# Patient Record
Sex: FEMALE
[De-identification: names, ages, dates, MRNs, and addresses within clinical notes are randomized per-mention and may not be internally consistent; named-entity substitution may affect disease eponyms.]

---

## 2017-01-17 ENCOUNTER — RECORD ABSTRACTING (OUTPATIENT)
Age: 22
End: 2017-01-17

## 2017-01-17 ENCOUNTER — APPOINTMENT (OUTPATIENT)
Dept: ALLERGY | Facility: CLINIC | Age: 22
End: 2017-01-17

## 2017-01-17 VITALS
WEIGHT: 215 LBS | RESPIRATION RATE: 14 BRPM | HEART RATE: 80 BPM | SYSTOLIC BLOOD PRESSURE: 140 MMHG | DIASTOLIC BLOOD PRESSURE: 90 MMHG | BODY MASS INDEX: 36.7 KG/M2 | HEIGHT: 64 IN

## 2017-01-17 DIAGNOSIS — Z83.3 FAMILY HISTORY OF DIABETES MELLITUS: ICD-10-CM

## 2017-01-17 DIAGNOSIS — Z87.09 PERSONAL HISTORY OF OTHER DISEASES OF THE RESPIRATORY SYSTEM: ICD-10-CM

## 2017-01-17 DIAGNOSIS — J45.20 MILD INTERMITTENT ASTHMA, UNCOMPLICATED: ICD-10-CM

## 2017-01-17 DIAGNOSIS — F17.210 NICOTINE DEPENDENCE, CIGARETTES, UNCOMPLICATED: ICD-10-CM

## 2017-01-17 RX ORDER — DEXTROAMPHETAMINE SULFATE, DEXTROAMPHETAMINE SACCHARATE, AMPHETAMINE SULFATE AND AMPHETAMINE ASPARTATE 7.5; 7.5; 7.5; 7.5 MG/1; MG/1; MG/1; MG/1
30 CAPSULE, EXTENDED RELEASE ORAL
Refills: 0 | Status: ACTIVE | COMMUNITY

## 2017-01-17 RX ORDER — ALBUTEROL SULFATE 90 UG/1
108 AEROSOL, METERED RESPIRATORY (INHALATION)
Refills: 0 | Status: ACTIVE | COMMUNITY

## 2017-01-17 RX ORDER — DEXTROAMPHETAMINE SULFATE, DEXTROAMPHETAMINE SACCHARATE, AMPHETAMINE SULFATE AND AMPHETAMINE ASPARTATE 3.75; 3.75; 3.75; 3.75 MG/1; MG/1; MG/1; MG/1
15 CAPSULE, EXTENDED RELEASE ORAL
Refills: 0 | Status: ACTIVE | COMMUNITY

## 2017-01-23 ENCOUNTER — RESULT REVIEW (OUTPATIENT)
Age: 22
End: 2017-01-23

## 2017-02-08 ENCOUNTER — TRANSCRIPTION ENCOUNTER (OUTPATIENT)
Age: 22
End: 2017-02-08

## 2017-08-10 ENCOUNTER — APPOINTMENT (OUTPATIENT)
Dept: ORTHOPEDIC SURGERY | Facility: CLINIC | Age: 22
End: 2017-08-10
Payer: COMMERCIAL

## 2017-08-10 VITALS — BODY MASS INDEX: 32.44 KG/M2 | HEIGHT: 64 IN | WEIGHT: 190 LBS

## 2017-08-10 VITALS — DIASTOLIC BLOOD PRESSURE: 76 MMHG | SYSTOLIC BLOOD PRESSURE: 108 MMHG | HEART RATE: 69 BPM

## 2017-08-10 DIAGNOSIS — S99.911A UNSPECIFIED INJURY OF RIGHT ANKLE, INITIAL ENCOUNTER: ICD-10-CM

## 2017-08-10 DIAGNOSIS — M21.6X1 OTHER ACQUIRED DEFORMITIES OF RIGHT FOOT: ICD-10-CM

## 2017-08-10 DIAGNOSIS — M25.571 PAIN IN RIGHT ANKLE AND JOINTS OF RIGHT FOOT: ICD-10-CM

## 2017-08-10 PROCEDURE — 73610 X-RAY EXAM OF ANKLE: CPT | Mod: RT

## 2017-08-10 PROCEDURE — 99203 OFFICE O/P NEW LOW 30 MIN: CPT

## 2017-09-04 PROBLEM — S99.911A RIGHT ANKLE INJURY: Status: ACTIVE | Noted: 2017-09-04

## 2017-09-04 PROBLEM — M21.6X1 GASTROCNEMIUS EQUINUS OF RIGHT LOWER EXTREMITY: Status: ACTIVE | Noted: 2017-09-04

## 2017-09-04 PROBLEM — S99.911A INJURY OF RIGHT ANKLE, INITIAL ENCOUNTER: Status: ACTIVE | Noted: 2017-09-04

## 2018-11-03 ENCOUNTER — TRANSCRIPTION ENCOUNTER (OUTPATIENT)
Age: 23
End: 2018-11-03

## 2018-12-04 ENCOUNTER — RESULT REVIEW (OUTPATIENT)
Age: 23
End: 2018-12-04

## 2019-01-24 ENCOUNTER — APPOINTMENT (OUTPATIENT)
Dept: ALLERGY | Facility: CLINIC | Age: 24
End: 2019-01-24
Payer: COMMERCIAL

## 2019-01-24 VITALS
DIASTOLIC BLOOD PRESSURE: 80 MMHG | SYSTOLIC BLOOD PRESSURE: 110 MMHG | RESPIRATION RATE: 16 BRPM | WEIGHT: 210 LBS | BODY MASS INDEX: 35.85 KG/M2 | HEIGHT: 64 IN | HEART RATE: 72 BPM

## 2019-01-24 PROCEDURE — 99214 OFFICE O/P EST MOD 30 MIN: CPT | Mod: 25

## 2019-01-24 PROCEDURE — 94060 EVALUATION OF WHEEZING: CPT

## 2019-01-24 RX ORDER — AMOXICILLIN 500 MG/1
500 CAPSULE ORAL
Qty: 21 | Refills: 0 | Status: DISCONTINUED | COMMUNITY
Start: 2018-08-16

## 2019-01-24 RX ORDER — VALACYCLOVIR 500 MG/1
500 TABLET, FILM COATED ORAL
Qty: 30 | Refills: 0 | Status: ACTIVE | COMMUNITY
Start: 2018-12-04

## 2019-01-24 RX ORDER — LORAZEPAM 1 MG/1
1 TABLET ORAL
Qty: 30 | Refills: 0 | Status: ACTIVE | COMMUNITY
Start: 2018-12-03

## 2019-01-24 RX ORDER — ALBUTEROL SULFATE 90 UG/1
108 (90 BASE) AEROSOL, METERED RESPIRATORY (INHALATION)
Qty: 2 | Refills: 1 | Status: ACTIVE | COMMUNITY
Start: 2019-01-24 | End: 1900-01-01

## 2019-01-24 RX ORDER — MUPIROCIN 20 MG/G
2 OINTMENT TOPICAL
Qty: 22 | Refills: 0 | Status: DISCONTINUED | COMMUNITY
Start: 2018-12-04

## 2019-01-24 RX ORDER — IBUPROFEN 800 MG/1
800 TABLET ORAL
Qty: 18 | Refills: 0 | Status: DISCONTINUED | COMMUNITY
Start: 2018-08-16

## 2019-01-24 RX ORDER — CLINDAMYCIN PHOSPHATE 100 MG/5G
2 CREAM VAGINAL
Qty: 5 | Refills: 0 | Status: DISCONTINUED | COMMUNITY
Start: 2018-12-19

## 2019-01-24 RX ORDER — METRONIDAZOLE 7.5 MG/G
0.75 GEL VAGINAL
Qty: 70 | Refills: 0 | Status: DISCONTINUED | COMMUNITY
Start: 2019-01-23

## 2019-01-24 NOTE — SOCIAL HISTORY
[Mother] : mother [Father] : father [House] : [unfilled] lives in a house  [Radiator/Baseboard] : heating provided by radiator(s)/baseboard(s) [Central] : air conditioning provided by central unit [Dog] : dog [Single] : single [FreeTextEntry2] : health insurance [employer] care registration [Bedroom] : not in the bedroom [Basement] : not in the basement [Living Area] : not in the living area [Smokers in Household] : there are no smokers in the home [de-identified] : x2

## 2019-01-24 NOTE — HISTORY OF PRESENT ILLNESS
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    Patient uses rescue inhaler with hiking at high elevation and prior to scuba diving preventatively.   Otherwise she has not needed her inhaler.    She does not need the inhaler for scuba diving but using as a good idea.   She will be away 10-12 weeks.

## 2019-01-24 NOTE — ASSESSMENT
[FreeTextEntry1] : Mild intermittent asthma:\par \par Proventil 2 puffs prior to scuba diving as a precaution \par Patient can scuba dive based upon normal spirometry and no asthma symptoms other than related to exertion at high altitude.

## 2019-01-24 NOTE — PHYSICAL EXAM
[Alert] : alert [Well Nourished] : well nourished [Healthy Appearance] : healthy appearance [No Acute Distress] : no acute distress [Well Developed] : well developed [Normal Voice/Communication] : normal voice communication [Normal Lips/Tongue] : the lips and tongue were normal [Normal Tonsils] : normal tonsils [No Oral Lesions or Ulcers] : no oral lesions or ulcers [No Neck Mass] : no neck mass was observed [No LAD] : no lymphadenopathy [No Thyroid Mass] : no thyroid mass [Supple] : the neck was supple [Normal Rate and Effort] : normal respiratory rhythm and effort [No Crackles] : no crackles [No Retractions] : no retractions [Bilateral Audible Breath Sounds] : bilateral audible breath sounds [Normal Rate] : heart rate was normal  [Normal S1, S2] : normal S1 and S2 [No murmur] : no murmur [Regular Rhythm] : with a regular rhythm [Normal Cervical Lymph Nodes] : cervical [Skin Intact] : skin intact  [No Rash] : no rash [No clubbing] : no clubbing [No Edema] : no edema [No Cyanosis] : no cyanosis [Normal Mood] : mood was normal [Normal Affect] : affect was normal [Judgment and Insight Age Appropriate] : judgement and insight is age appropriate [Alert, Awake, Oriented as Age-Appropriate] : alert, awake, oriented as age appropriate [Conjunctival Erythema] : no conjunctival erythema [Suborbital Bogginess] : no suborbital bogginess (allergic shiners) [Wheezing] : no wheezing was heard

## 2019-06-13 ENCOUNTER — APPOINTMENT (OUTPATIENT)
Dept: ALLERGY | Facility: CLINIC | Age: 24
End: 2019-06-13
Payer: COMMERCIAL

## 2019-06-13 VITALS
WEIGHT: 198 LBS | DIASTOLIC BLOOD PRESSURE: 77 MMHG | SYSTOLIC BLOOD PRESSURE: 108 MMHG | RESPIRATION RATE: 16 BRPM | HEART RATE: 65 BPM | BODY MASS INDEX: 33.8 KG/M2 | HEIGHT: 64 IN | OXYGEN SATURATION: 98 %

## 2019-06-13 DIAGNOSIS — Z87.09 PERSONAL HISTORY OF OTHER DISEASES OF THE RESPIRATORY SYSTEM: ICD-10-CM

## 2019-06-13 PROCEDURE — 99214 OFFICE O/P EST MOD 30 MIN: CPT | Mod: 25

## 2019-06-13 PROCEDURE — 94060 EVALUATION OF WHEEZING: CPT

## 2019-06-13 RX ORDER — ALBUTEROL SULFATE 90 UG/1
108 (90 BASE) AEROSOL, METERED RESPIRATORY (INHALATION)
Qty: 2 | Refills: 1 | Status: ACTIVE | OUTPATIENT
Start: 2017-01-17

## 2019-06-13 RX ORDER — METRONIDAZOLE 500 MG/1
500 TABLET ORAL
Qty: 60 | Refills: 0 | Status: DISCONTINUED | COMMUNITY
Start: 2019-04-08

## 2019-06-13 RX ORDER — METRONIDAZOLE 65 MG/5G
1.3 GEL TOPICAL
Qty: 5 | Refills: 0 | Status: DISCONTINUED | COMMUNITY
Start: 2019-01-31

## 2019-06-13 NOTE — PHYSICAL EXAM
[Alert] : alert [Well Nourished] : well nourished [Healthy Appearance] : healthy appearance [No Acute Distress] : no acute distress [Well Developed] : well developed [Normal Voice/Communication] : normal voice communication [Conjunctival Erythema] : no conjunctival erythema [Suborbital Bogginess] : no suborbital bogginess (allergic shiners) [Normal TMs] : both tympanic membranes were normal [Normal Lips/Tongue] : the lips and tongue were normal [Normal Outer Ear/Nose] : the ears and nose were normal in appearance [Normal Tonsils] : normal tonsils [No Thrush] : no thrush [Boggy Nasal Turbinates] : boggy and/or pale nasal turbinates [No Oral Lesions or Ulcers] : no oral lesions or ulcers [No Neck Mass] : no neck mass was observed [Clear Rhinorrhea] : clear rhinorrhea was seen [No LAD] : no lymphadenopathy [No Thyroid Mass] : no thyroid mass [Supple] : the neck was supple [Normal Rate and Effort] : normal respiratory rhythm and effort [No Crackles] : no crackles [No Retractions] : no retractions [Bilateral Audible Breath Sounds] : bilateral audible breath sounds [Wheezing] : no wheezing was heard [Normal Rate] : heart rate was normal  [Normal S1, S2] : normal S1 and S2 [No murmur] : no murmur [Regular Rhythm] : with a regular rhythm [Skin Intact] : skin intact  [Normal Cervical Lymph Nodes] : cervical [No Rash] : no rash [No clubbing] : no clubbing [No Edema] : no edema [No Cyanosis] : no cyanosis [Normal Mood] : mood was normal [Normal Affect] : affect was normal [Judgment and Insight Age Appropriate] : judgement and insight is age appropriate [Alert, Awake, Oriented as Age-Appropriate] : alert, awake, oriented as age appropriate

## 2019-06-13 NOTE — HISTORY OF PRESENT ILLNESS
[de-identified] : Patient did well with scuba diving and she developed a double ear infection - symptoms improved and hearing has almost returned to normal.  Patient desires to work with scuba diving.  Patient going to [place] next week - accumulating a portfolio.   Patient is using her rescue inhaler only.   Patient now with nasal congestion , sneezing, rhinorrhea, yellow nasal mucus.

## 2019-06-13 NOTE — ASSESSMENT
[FreeTextEntry1] : Mild intermittent asthma:\par \par Proair 2 puffs QID prn \par \par Sinusitis:\par \par Cefzil 250 mg BID x 14 days

## 2019-06-13 NOTE — SOCIAL HISTORY
[Mother] : mother [Father] : father [FreeTextEntry2] : health insurance [employer] care registration [House] : [unfilled] lives in a house  [Radiator/Baseboard] : heating provided by radiator(s)/baseboard(s) [Central] : air conditioning provided by central unit [Bedroom] : not in the bedroom [Basement] : not in the basement [Living Area] : not in the living area [Dog] : dog [Single] : single [Smokers in Household] : there are no smokers in the home [de-identified] : x2

## 2019-09-04 ENCOUNTER — TRANSCRIPTION ENCOUNTER (OUTPATIENT)
Age: 24
End: 2019-09-04

## 2019-12-02 ENCOUNTER — TRANSCRIPTION ENCOUNTER (OUTPATIENT)
Age: 24
End: 2019-12-02

## 2020-12-21 PROBLEM — Z87.09 HISTORY OF ACUTE SINUSITIS: Status: RESOLVED | Noted: 2019-06-13 | Resolved: 2020-12-21

## 2021-03-25 ENCOUNTER — APPOINTMENT (OUTPATIENT)
Dept: ALLERGY | Facility: CLINIC | Age: 26
End: 2021-03-25
Payer: COMMERCIAL

## 2021-03-25 PROCEDURE — 99213 OFFICE O/P EST LOW 20 MIN: CPT | Mod: 95

## 2021-03-25 RX ORDER — ALBUTEROL SULFATE 90 UG/1
108 (90 BASE) INHALANT RESPIRATORY (INHALATION)
Qty: 1 | Refills: 2 | Status: ACTIVE | COMMUNITY
Start: 2021-03-25 | End: 1900-01-01

## 2021-03-25 NOTE — HISTORY OF PRESENT ILLNESS
[Home] : at home, [unfilled] , at the time of the visit. 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  She will be scuba diving regularly in this program.  She has had minimal use of her rescue inhaler - she will need respiratory clearance in order to start the program.

## 2021-03-25 NOTE — ASSESSMENT
[FreeTextEntry1] : Mild intermittent asthma:\par \par I have advised Julieta that she needs an updated spirometry/PFT in order to obtain clearance for her next program and that she should consult with a pulmonary physician in [place] for evaluation,  In the interim I have renewed her Proair. \par \par This visit was provided via telehealth using real time 2-way audio visual technology.  The patient, Julieta Ross, was located at home, at the time of the visit.   The provider, Mitchell Boxer, M.D., was located at the office, [address] [place] [address], at the time of the visit.\par \par The patient, Julieta Ross and physician, Mitchell Boxer, M.D., participated in the telehealth encounter.\par \par Verbal consent obtained by  from patient.\par \par The majority of time (>50%) was spent on counseling and coordination of care with this patient and/or family member.  The approximate physician face to face time was 20 minutes for the diagnosis of mild intermittent asthma. \par

## 2021-07-23 ENCOUNTER — APPOINTMENT (OUTPATIENT)
Dept: OBGYN | Facility: CLINIC | Age: 26
End: 2021-07-23
Payer: COMMERCIAL

## 2021-07-23 VITALS
WEIGHT: 220 LBS | BODY MASS INDEX: 37.56 KG/M2 | SYSTOLIC BLOOD PRESSURE: 124 MMHG | HEIGHT: 64 IN | DIASTOLIC BLOOD PRESSURE: 80 MMHG

## 2021-07-23 DIAGNOSIS — Z00.00 ENCOUNTER FOR GENERAL ADULT MEDICAL EXAMINATION W/OUT ABNORMAL FINDINGS: ICD-10-CM

## 2021-07-23 PROCEDURE — 87070 CULTURE OTHR SPECIMN AEROBIC: CPT

## 2021-07-23 PROCEDURE — 99395 PREV VISIT EST AGE 18-39: CPT

## 2021-07-23 PROCEDURE — 99072 ADDL SUPL MATRL&STAF TM PHE: CPT

## 2021-07-23 PROCEDURE — 36415 COLL VENOUS BLD VENIPUNCTURE: CPT

## 2021-07-23 RX ORDER — CEFPROZIL 250 MG/1
250 TABLET ORAL
Qty: 28 | Refills: 0 | Status: DISCONTINUED | COMMUNITY
Start: 2019-06-13 | End: 2021-07-23

## 2021-07-27 LAB
C TRACH RRNA SPEC QL NAA+PROBE: NOT DETECTED
CANDIDA VAG CYTO: NOT DETECTED
G VAGINALIS+PREV SP MTYP VAG QL MICRO: DETECTED
HBV SURFACE AG SER QL: NONREACTIVE
HCV AB SER QL: NONREACTIVE
HCV S/CO RATIO: 0.12 S/CO
HIV1+2 AB SPEC QL IA.RAPID: NONREACTIVE
N GONORRHOEA RRNA SPEC QL NAA+PROBE: NOT DETECTED
SOURCE AMPLIFICATION: NORMAL
T PALLIDUM AB SER QL IA: NEGATIVE
T VAGINALIS VAG QL WET PREP: NOT DETECTED

## 2021-07-29 DIAGNOSIS — B96.89 ACUTE VAGINITIS: ICD-10-CM

## 2021-07-29 DIAGNOSIS — N76.0 ACUTE VAGINITIS: ICD-10-CM

## 2021-08-04 LAB — CYTOLOGY CVX/VAG DOC THIN PREP: ABNORMAL

## 2021-08-06 ENCOUNTER — TRANSCRIPTION ENCOUNTER (OUTPATIENT)
Age: 26
End: 2021-08-06

## 2022-04-11 ENCOUNTER — NON-APPOINTMENT (OUTPATIENT)
Age: 27
End: 2022-04-11

## 2022-06-29 DIAGNOSIS — N76.0 ACUTE VAGINITIS: ICD-10-CM

## 2022-06-29 RX ORDER — FLUCONAZOLE 150 MG/1
150 TABLET ORAL
Qty: 2 | Refills: 0 | Status: ACTIVE | COMMUNITY
Start: 2019-01-23

## 2022-06-29 RX ORDER — METRONIDAZOLE 7.5 MG/G
0.75 GEL VAGINAL
Qty: 1 | Refills: 0 | Status: ACTIVE | COMMUNITY
Start: 2021-07-29 | End: 1900-01-01

## 2023-08-19 ENCOUNTER — APPOINTMENT (OUTPATIENT)
Dept: ORTHOPEDIC SURGERY | Facility: CLINIC | Age: 28
End: 2023-08-19
Payer: COMMERCIAL

## 2023-08-19 ENCOUNTER — NON-APPOINTMENT (OUTPATIENT)
Age: 28
End: 2023-08-19

## 2023-08-19 VITALS — SYSTOLIC BLOOD PRESSURE: 138 MMHG | DIASTOLIC BLOOD PRESSURE: 85 MMHG | HEART RATE: 85 BPM | HEIGHT: 64 IN

## 2023-08-19 VITALS — WEIGHT: 225 LBS | BODY MASS INDEX: 38.62 KG/M2

## 2023-08-19 DIAGNOSIS — M79.672 PAIN IN LEFT FOOT: ICD-10-CM

## 2023-08-19 PROCEDURE — 99204 OFFICE O/P NEW MOD 45 MIN: CPT

## 2023-08-19 PROCEDURE — 73630 X-RAY EXAM OF FOOT: CPT | Mod: LT

## 2023-08-19 RX ORDER — DICLOFENAC SODIUM 75 MG/1
75 TABLET, DELAYED RELEASE ORAL
Qty: 60 | Refills: 0 | Status: ACTIVE | COMMUNITY
Start: 2023-08-19 | End: 1900-01-01

## 2023-08-23 PROBLEM — M79.672 LEFT FOOT PAIN: Status: ACTIVE | Noted: 2023-08-19

## 2023-08-25 ENCOUNTER — APPOINTMENT (OUTPATIENT)
Dept: MRI IMAGING | Facility: CLINIC | Age: 28
End: 2023-08-25

## 2023-08-25 ENCOUNTER — OUTPATIENT (OUTPATIENT)
Dept: OUTPATIENT SERVICES | Facility: HOSPITAL | Age: 28
LOS: 1 days | End: 2023-08-25
Payer: COMMERCIAL

## 2023-08-25 ENCOUNTER — RESULT REVIEW (OUTPATIENT)
Age: 28
End: 2023-08-25

## 2023-08-25 DIAGNOSIS — M84.375A STRESS FRACTURE, LEFT FOOT, INITIAL ENCOUNTER FOR FRACTURE: ICD-10-CM

## 2023-08-25 DIAGNOSIS — M79.672 PAIN IN LEFT FOOT: ICD-10-CM

## 2023-08-25 PROCEDURE — 73718 MRI LOWER EXTREMITY W/O DYE: CPT | Mod: 26,LT

## 2023-08-26 ENCOUNTER — NON-APPOINTMENT (OUTPATIENT)
Age: 28
End: 2023-08-26

## 2023-08-26 NOTE — HISTORY OF PRESENT ILLNESS
[FreeTextEntry1] : 08/19/2023: Patient is a 28 year-old female who presents to the office today for initial evaluation of left foot pain. Injury occurred a month ago when she everted her left foot when walking on an uneven surface when in [place]. Started a new job, foot felt better. Last weekend went for a foot massage which made pain return. Pain is constant and waxes and wanes in intensity. She also states she hears a popping/clicking noise while walking this past week. Pain is directly over the distal aspect of the 4th metatarsal. Advil gives some mild relief, as well as ice. She denies paresthesias of the foot.  No bleeding, fever, chills, sweats, nausea or vomiting were endorsed at this visit. The above history is in addition to the intake form, which I personally reviewed at length, including the patient's medical, surgical, and family history. The patient's allergies were also carefully reviewed. In addition, the family and social history of the patient were also reviewed, which are non-contributory to this visit unless specified above. All of this has been documented accordingly in the visit note.

## 2023-08-26 NOTE — PHYSICAL EXAM
[Normal Finger/nose] : finger to nose coordination [Normal] : no peripheral adenopathy appreciated [de-identified] : Left Foot/Ankle Exam  Skin warm, Dry, no lesions, no erythema, no bleeding, no open wounds No Scars  Pulse to DP and PT intact  Sensation to touch intact  Syndesmotic Squeeze test - Negative  No deformities noted to foot or ankle   Swelling - N/A Tenderness - across distal metatarsals, particularly 2-4 No tenderness to  Lateral Mall, Medial Mall, Achilles, Calcaneus, 5th metatarsal, mid foot, toes.  No Anterior Drawer noted  Conner Test - reveals intact Achilles   Lindsay's Test Positive   ROM Plantarflexion - 0-50 Dorsiflexion - 0-20 Inversion - 0-35 Eversion 0-15  No tenderness to proximal fibula  [de-identified] : CHRISTOPHERR [de-identified] : 3 xray views of the left foot were obtained.  -No fractures or dislocations

## 2023-08-26 NOTE — ASSESSMENT
[FreeTextEntry1] : Patient presents with left foot pain. Their symptoms are consistent with a possible neuroma. The nature of this condition was described at length with the patient and they verbalized understanding.   Recommendations:  -MRI Left Foot to rule out a neuroma -Diclofenac (Rx Sent) -WBAT/Activity as tolerated -Followup with Dr. Benavides after MRI to discuss results and further treatment options -Patient was given ample time to ask questions and all questions were answered to the patient's full satisfaction.  The patient was in full agreement with this plan and appreciative of their care.

## 2024-02-06 ENCOUNTER — APPOINTMENT (OUTPATIENT)
Dept: ORTHOPEDIC SURGERY | Facility: CLINIC | Age: 29
End: 2024-02-06
Payer: COMMERCIAL

## 2024-02-06 VITALS — HEIGHT: 64 IN | WEIGHT: 250 LBS | BODY MASS INDEX: 42.68 KG/M2

## 2024-02-06 DIAGNOSIS — M67.472 GANGLION, LEFT ANKLE AND FOOT: ICD-10-CM

## 2024-02-06 PROCEDURE — 99214 OFFICE O/P EST MOD 30 MIN: CPT | Mod: 25

## 2024-02-06 PROCEDURE — 20612 ASPIRATE/INJ GANGLION CYST: CPT

## 2024-02-06 NOTE — HISTORY OF PRESENT ILLNESS
[Other: ____] : [unfilled] [FreeTextEntry1] : Reports ganglion cyst dorsolateral aspect L hindfoot for several weeks.  Denies antecedent trauma nor additional musculoskeletal complaints referable to foot / ankle.

## 2024-02-06 NOTE — PHYSICAL EXAM
[de-identified] : Extremity: +equinus (releases) L LE, +PPAV L foot (supple), presumptive ganglion cyst dorsolateral aspect L hindfoot.  Nontender L ankle, peroneals, syndesmosis, Achilles, hindfoot ST, midfoot LF and PTT insertional, and forefoot / metatarsals.  Calves soft and nontender, sensorimotor unchanged, skin intact L LE.  AOx3, mood / affect normal. [de-identified] : EXAM: [id number] - MR FOOT LT  - ORDERED BY: MUNDO GALLAGHER PROCEDURE DATE:  08/25/2023 INTERPRETATION:  CLINICAL HISTORY: 28-year-old with pain. For assessment of Lindsay's neuroma. FINDINGS: MRI of the left forefoot was performed in the axial, coronal and sagittal planes with proton density and fluid sensitive weighting with and without fat suppression. Reference is made to radiograph dated 11/3/2018.  Evaluation of the forefoot demonstrate that the tarsometatarsal joint is in appropriate alignment. The Lisfranc ligament is intact. There is a significant zone of marrow edema throughout the base of the fourth metatarsal with linear component consistent with fracture (image 26, sagittal). There is intermetatarsal bursitis of the first, second and third interspace. There is no evidence of Lindsay's neuroma. There is no abnormal signal within either the medial and lateral sesamoid. Plantar plate mechanism of the first MTP joint is intact. There is edematous infiltration of the subcutaneous tissues dorsally and laterally. No significant muscle edema.  IMPRESSION:  Stress fracture of the base of the fourth metatarsal.  Dr. Jessica Brown can be reached at [email or web address] with questions regarding this report.  --- End of Report ---  JESSICA BROWN MD; Attending Radiologist This document has been electronically signed. Aug 25 2023 11:45AM

## 2024-02-06 NOTE — DISCUSSION/SUMMARY
[de-identified] : Discussed with patient nature of condition, potential course / sequelae, options reviewed and patient requesting aspiration procedure (risks, benefits reviewed in advance).  Under sterile conditions, aspiration 1.5 cc clear gelatinous fluid from ganglion cyst dorsolateral aspect L hindfoot, tolerated well by patient and Band-Aid applied.  NB shoe wear, activity modification, HEP.  Return to office in 6 - 8 weeks / PRN, all questions answered.

## 2024-05-16 ENCOUNTER — APPOINTMENT (OUTPATIENT)
Dept: OBGYN | Facility: CLINIC | Age: 29
End: 2024-05-16
Payer: SELF-PAY

## 2024-05-16 VITALS
WEIGHT: 242 LBS | BODY MASS INDEX: 41.32 KG/M2 | HEIGHT: 64 IN | SYSTOLIC BLOOD PRESSURE: 137 MMHG | DIASTOLIC BLOOD PRESSURE: 89 MMHG

## 2024-05-16 DIAGNOSIS — Z01.419 ENCOUNTER FOR GYNECOLOGICAL EXAMINATION (GENERAL) (ROUTINE) W/OUT ABNORMAL FINDINGS: ICD-10-CM

## 2024-05-16 DIAGNOSIS — N76.0 ACUTE VAGINITIS: ICD-10-CM

## 2024-05-16 DIAGNOSIS — N92.0 EXCESSIVE AND FREQUENT MENSTRUATION WITH REGULAR CYCLE: ICD-10-CM

## 2024-05-16 DIAGNOSIS — Z11.3 ENCOUNTER FOR SCREENING FOR INFECTIONS WITH A PREDOMINANTLY SEXUAL MODE OF TRANSMISSION: ICD-10-CM

## 2024-05-16 PROCEDURE — 99395 PREV VISIT EST AGE 18-39: CPT

## 2024-05-16 PROCEDURE — 99459 PELVIC EXAMINATION: CPT

## 2024-05-17 LAB
BV BACTERIA RRNA VAG QL NAA+PROBE: NOT DETECTED
C GLABRATA RNA VAG QL NAA+PROBE: NOT DETECTED
CANDIDA RRNA VAG QL PROBE: NOT DETECTED
HPV HIGH+LOW RISK DNA PNL CVX: NOT DETECTED
T VAGINALIS RRNA SPEC QL NAA+PROBE: NOT DETECTED

## 2024-05-22 LAB — CYTOLOGY CVX/VAG DOC THIN PREP: NORMAL

## 2024-05-22 NOTE — PLAN
[FreeTextEntry1] : Routine GYN: D/w pt the importance of BSE. Pelvic and breast exam performed. Pap performed. GC/Chl via Pap.  Rx given for STI blood panel.   Recurrent BV: Vaginitis panel performed Advised weekly boric acid suppository  R/o PCOS: Advised to schedule pelvic sono and brief MD visit, rx given Endocrine will draw panel for PCOS  RTO in 1 year, or PRN.

## 2024-05-22 NOTE — SIGNATURES
[TextEntry] : This note was authored by Lamont Doherty working as a scribe for Dr. Rafael Monet.   I, Dr. Rafael Monet, have reviewed the content of this note and confirm it is true and accurate. I personally performed the history and physical examination and made all the decisions. 05/16/2024

## 2024-05-22 NOTE — HISTORY OF PRESENT ILLNESS
[FreeTextEntry1] : 05/16/2024. RUBEN DAIGLE 28-year-old female G0 LMP 5/5. She presents for an annual gyn exam.  Patient was seen by GYN in [place] for recurrent BV in 11/22, IUD was removed at the time. However, she reports persistent vaginal malodor.   She reports monthly menses, lasting 5 days in duration, heavier in flow.   Pt recently dx'd w/ hypothyroidism, for which she is followed by endocrinologist and nutritionist. Admits significant weight gain and decreased libido since last visit. She reports dark boils on inner thighs. Admits 10-lbs since starting Ozempic in 12/23.  She denies abdominal and pelvic pain. No abnormal vaginal bleeding, vaginal discharge, or vaginitis symptoms. She reports normal urination, no dysuria, no incontinence of urine. BM is normal per patient, no blood in stool or constipation/diarrhea.  Pt just got out of long-term relationship.  Otherwise, no new medical conditions, medications, or surgeries since last visit.  PMHx: hypothyroid, asthma, insulin-resistance, fatty liver SocHx: works as   Meds: Ozempic, Levothyroxine 75, weekly Vit D, Ashwagandha, Liothyronine, Beata B

## 2024-05-22 NOTE — PHYSICAL EXAM
[Chaperone Present] : A chaperone was present in the examining room during all aspects of the physical examination [[id number]] : A chaperone was present during the pelvic exam. [Appropriately responsive] : appropriately responsive [Alert] : alert [No Acute Distress] : no acute distress [No Lymphadenopathy] : no lymphadenopathy [Regular Rate Rhythm] : regular rate rhythm [No Murmurs] : no murmurs [Clear to Auscultation B/L] : clear to auscultation bilaterally [Soft] : soft [Non-tender] : non-tender [Non-distended] : non-distended [No HSM] : No HSM [No Lesions] : no lesions [No Mass] : no mass [Oriented x3] : oriented x3 [Examination Of The Breasts] : a normal appearance [No Masses] : no breast masses were palpable [Labia Majora] : normal [Labia Minora] : normal [Normal] : normal [Uterine Adnexae] : normal [FreeTextEntry2] : Lamont Doherty

## 2024-06-26 ENCOUNTER — APPOINTMENT (OUTPATIENT)
Dept: OBGYN | Facility: CLINIC | Age: 29
End: 2024-06-26

## 2024-07-29 ENCOUNTER — NON-APPOINTMENT (OUTPATIENT)
Age: 29
End: 2024-07-29

## 2024-07-31 ENCOUNTER — APPOINTMENT (OUTPATIENT)
Dept: ULTRASOUND IMAGING | Facility: CLINIC | Age: 29
End: 2024-07-31
Payer: COMMERCIAL

## 2024-07-31 PROCEDURE — 76856 US EXAM PELVIC COMPLETE: CPT

## 2024-07-31 PROCEDURE — 76830 TRANSVAGINAL US NON-OB: CPT

## 2024-09-04 ENCOUNTER — APPOINTMENT (OUTPATIENT)
Dept: OBGYN | Facility: CLINIC | Age: 29
End: 2024-09-04

## 2024-09-17 ENCOUNTER — NON-APPOINTMENT (OUTPATIENT)
Age: 29
End: 2024-09-17

## 2024-09-17 ENCOUNTER — APPOINTMENT (OUTPATIENT)
Dept: ALLERGY | Facility: CLINIC | Age: 29
End: 2024-09-17
Payer: COMMERCIAL

## 2024-09-17 VITALS
HEART RATE: 68 BPM | SYSTOLIC BLOOD PRESSURE: 127 MMHG | DIASTOLIC BLOOD PRESSURE: 84 MMHG | WEIGHT: 220 LBS | HEIGHT: 64 IN | BODY MASS INDEX: 37.56 KG/M2 | OXYGEN SATURATION: 98 %

## 2024-09-17 DIAGNOSIS — Z87.891 PERSONAL HISTORY OF NICOTINE DEPENDENCE: ICD-10-CM

## 2024-09-17 PROCEDURE — 95012 NITRIC OXIDE EXP GAS DETER: CPT

## 2024-09-17 PROCEDURE — 99204 OFFICE O/P NEW MOD 45 MIN: CPT | Mod: 25

## 2024-09-17 PROCEDURE — 94060 EVALUATION OF WHEEZING: CPT

## 2024-09-17 RX ORDER — TIRZEPATIDE 5 MG/.5ML
INJECTION, SOLUTION SUBCUTANEOUS
Refills: 0 | Status: ACTIVE | COMMUNITY

## 2024-09-17 RX ORDER — LEVOTHYROXINE SODIUM 0.17 MG/1
TABLET ORAL
Refills: 0 | Status: ACTIVE | COMMUNITY

## 2024-09-17 RX ORDER — ALBUTEROL SULFATE 90 UG/1
108 (90 BASE) INHALANT RESPIRATORY (INHALATION)
Qty: 2 | Refills: 1 | Status: ACTIVE | COMMUNITY
Start: 2024-09-17 | End: 1900-01-01

## 2024-09-17 NOTE — SOCIAL HISTORY
[Apartment] : [unfilled] lives in an apartment  [Single] : single [FreeTextEntry1] : Lives alone  Quit job - commercial photography  [Smokers in Household] : there are no smokers in the home

## 2024-09-17 NOTE — HISTORY OF PRESENT ILLNESS
[de-identified] : Patient was living in [facility] - traveling to [facility] for 1 month - patient is taking no regular medications - she has albuterol inhaler - last used was 2021.    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   She was able to scuba dive with no problems.   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   Patient with Hashimoto's on thyroid replaced.   She is taking weight loss medication.

## 2024-09-17 NOTE — ASSESSMENT
[FreeTextEntry1] : History of mild intermittent asthma - avid  - needs clearance of scuba diving in [facility].   Patient was advised based upon her history of asthma and normal spirometry and FENO she is cleared from an asthma standpoint for scuba diving.  She will have her rescue inhaler to use prn symptoms.

## 2024-09-17 NOTE — HISTORY OF PRESENT ILLNESS
[de-identified] : Patient was living in [facility] - traveling to [facility] for 1 month - patient is taking no regular medications - she has albuterol inhaler - last used was 2021.    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   She was able to scuba dive with no problems.   Patient desires to scuba dive in 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   Patient with Hashimoto's on thyroid replaced.   She is taking weight loss medication.

## 2024-09-17 NOTE — PHYSICAL EXAM
[Alert] : alert [Well Nourished] : well nourished [Healthy Appearance] : healthy appearance [No Acute Distress] : no acute distress [Well Developed] : well developed [No Neck Mass] : no neck mass was observed [No LAD] : no lymphadenopathy [No Thyroid Mass] : no thyroid mass [Normal Rate and Effort] : normal respiratory rhythm and effort [No Crackles] : no crackles [No Retractions] : no retractions [Bilateral Audible Breath Sounds] : bilateral audible breath sounds [Normal Rate] : heart rate was normal  [Normal S1, S2] : normal S1 and S2 [No murmur] : no murmur [Regular Rhythm] : with a regular rhythm [Normal Cervical Lymph Nodes] : cervical [Skin Intact] : skin intact  [Normal Mood] : mood was normal [Judgment and Insight Age Appropriate] : judgement and insight is age appropriate [Wheezing] : no wheezing was heard

## 2024-09-17 NOTE — ASSESSMENT
[FreeTextEntry1] : History of mild intermittent asthma - avid  - needs clearance of scuba diving in [facility].   Patient was advised based upon her history of asthma and normal spirometry and FENO she is cleared from an asthma standpoint for scuba diving.  She will have her rescue inhaler to use prn symptoms.

## 2025-04-14 ENCOUNTER — NON-APPOINTMENT (OUTPATIENT)
Age: 30
End: 2025-04-14

## 2025-04-14 ENCOUNTER — APPOINTMENT (OUTPATIENT)
Dept: OBGYN | Facility: CLINIC | Age: 30
End: 2025-04-14
Payer: SELF-PAY

## 2025-04-14 VITALS — DIASTOLIC BLOOD PRESSURE: 83 MMHG | SYSTOLIC BLOOD PRESSURE: 136 MMHG

## 2025-04-14 PROCEDURE — 99213 OFFICE O/P EST LOW 20 MIN: CPT

## 2025-04-15 LAB
BV BACTERIA RRNA VAG QL NAA+PROBE: NOT DETECTED
C GLABRATA RNA VAG QL NAA+PROBE: NOT DETECTED
C TRACH RRNA SPEC QL NAA+PROBE: NOT DETECTED
CANDIDA RRNA VAG QL PROBE: NOT DETECTED
N GONORRHOEA RRNA SPEC QL NAA+PROBE: NOT DETECTED
T VAGINALIS RRNA SPEC QL NAA+PROBE: NOT DETECTED

## 2025-05-12 ENCOUNTER — NON-APPOINTMENT (OUTPATIENT)
Age: 30
End: 2025-05-12

## 2025-05-15 ENCOUNTER — NON-APPOINTMENT (OUTPATIENT)
Age: 30
End: 2025-05-15

## 2025-06-04 ENCOUNTER — APPOINTMENT (OUTPATIENT)
Dept: OBGYN | Facility: CLINIC | Age: 30
End: 2025-06-04

## 2025-06-10 PROBLEM — B00.9 HSV INFECTION: Status: ACTIVE | Noted: 2025-06-10

## 2025-06-10 RX ORDER — VALACYCLOVIR 500 MG/1
500 TABLET, FILM COATED ORAL
Qty: 6 | Refills: 1 | Status: ACTIVE | COMMUNITY
Start: 2025-06-10 | End: 1900-01-01

## 2025-07-23 ENCOUNTER — NON-APPOINTMENT (OUTPATIENT)
Age: 30
End: 2025-07-23

## 2025-09-15 ENCOUNTER — LABORATORY RESULT (OUTPATIENT)
Age: 30
End: 2025-09-15

## 2025-09-15 ENCOUNTER — NON-APPOINTMENT (OUTPATIENT)
Age: 30
End: 2025-09-15

## 2025-09-15 ENCOUNTER — APPOINTMENT (OUTPATIENT)
Dept: OBGYN | Facility: CLINIC | Age: 30
End: 2025-09-15
Payer: SELF-PAY

## 2025-09-15 VITALS
SYSTOLIC BLOOD PRESSURE: 132 MMHG | HEIGHT: 64 IN | DIASTOLIC BLOOD PRESSURE: 81 MMHG | WEIGHT: 197 LBS | BODY MASS INDEX: 33.63 KG/M2

## 2025-09-15 DIAGNOSIS — Z11.3 ENCOUNTER FOR SCREENING FOR INFECTIONS WITH A PREDOMINANTLY SEXUAL MODE OF TRANSMISSION: ICD-10-CM

## 2025-09-15 DIAGNOSIS — Z01.419 ENCOUNTER FOR GYNECOLOGICAL EXAMINATION (GENERAL) (ROUTINE) W/OUT ABNORMAL FINDINGS: ICD-10-CM

## 2025-09-15 PROCEDURE — 99459 PELVIC EXAMINATION: CPT

## 2025-09-15 PROCEDURE — G0444 DEPRESSION SCREEN ANNUAL: CPT | Mod: 25

## 2025-09-15 PROCEDURE — 99395 PREV VISIT EST AGE 18-39: CPT

## 2025-09-24 LAB
CYTOLOGY CVX/VAG DOC THIN PREP: ABNORMAL
HBV SURFACE AG SER QL: NONREACTIVE
HCV AB SER QL: NONREACTIVE
HCV S/CO RATIO: 0.11 S/CO
HIV1+2 AB SPEC QL IA.RAPID: NONREACTIVE
HPV HIGH+LOW RISK DNA PNL CVX: DETECTED
T PALLIDUM AB SER QL IA: NEGATIVE